# Patient Record
(demographics unavailable — no encounter records)

---

## 2025-03-13 NOTE — HISTORY OF PRESENT ILLNESS
[Oral Contraceptive] : uses oral contraception pills [Y] : Patient is sexually active [Currently Active] : currently active [Men] : men [No] : No [Patient refuses STI testing] : Patient refuses STI testing [TextBox_4] : HSV IgG pos [Mammogramdate] : 4/24 [BreastSonogramDate] : 4/24 [PapSmeardate] : 8/24 [TextBox_31] : HPV [PGHxTotal] : 3 [[facility]] : 2 [PGHxAbortions] : 1 [[facility] Campusiving] : 3 [PGHxMultBirths] : 1 [FreeTextEntry1] : 1 d&E

## 2025-03-13 NOTE — PHYSICAL EXAM
[Chaperone Present] : A chaperone was present in the examining room during all aspects of the physical examination [Appropriately responsive] : appropriately responsive [Alert] : alert [No Acute Distress] : no acute distress [Soft] : soft [Non-tender] : non-tender [Non-distended] : non-distended [No Mass] : no mass [Oriented x3] : oriented x3 [Examination Of The Breasts] : a normal appearance [No Masses] : no breast masses were palpable [Labia Majora] : normal [Labia Minora] : normal [Normal] : normal [Uterine Adnexae] : normal [FreeTextEntry2] : blanca hancock

## 2025-03-13 NOTE — PLAN
[FreeTextEntry1] : 44-year-old for contraceptive management Offered patient switch from Minestrin to low Loestrin.  Patient wishes to try samples given prescription sent to pharmacy instructions on switching given Mammo sono 4/25 Pap 8/25 Self breast exams encouraged Follow-up for Pap